# Patient Record
Sex: MALE | Race: WHITE
[De-identification: names, ages, dates, MRNs, and addresses within clinical notes are randomized per-mention and may not be internally consistent; named-entity substitution may affect disease eponyms.]

---

## 2020-07-14 ENCOUNTER — HOSPITAL ENCOUNTER (OUTPATIENT)
Dept: HOSPITAL 75 - LAB | Age: 30
End: 2020-07-14
Attending: PHYSICIAN ASSISTANT
Payer: COMMERCIAL

## 2020-07-14 DIAGNOSIS — Z00.00: Primary | ICD-10-CM

## 2020-07-14 DIAGNOSIS — F90.9: ICD-10-CM

## 2020-07-14 DIAGNOSIS — I10: ICD-10-CM

## 2020-07-14 LAB
ALBUMIN SERPL-MCNC: 5 GM/DL (ref 3.2–4.5)
ALP SERPL-CCNC: 74 U/L (ref 40–136)
ALT SERPL-CCNC: 86 U/L (ref 0–55)
BASOPHILS # BLD AUTO: 0 10^3/UL (ref 0–0.1)
BASOPHILS NFR BLD AUTO: 1 % (ref 0–10)
BILIRUB SERPL-MCNC: 0.7 MG/DL (ref 0.1–1)
BUN/CREAT SERPL: 18
CALCIUM SERPL-MCNC: 10.2 MG/DL (ref 8.5–10.1)
CHLORIDE SERPL-SCNC: 105 MMOL/L (ref 98–107)
CHOLEST SERPL-MCNC: 203 MG/DL (ref ?–200)
CO2 SERPL-SCNC: 25 MMOL/L (ref 21–32)
CREAT SERPL-MCNC: 0.85 MG/DL (ref 0.6–1.3)
EOSINOPHIL # BLD AUTO: 0.4 10^3/UL (ref 0–0.3)
EOSINOPHIL NFR BLD AUTO: 6 % (ref 0–10)
ERYTHROCYTE [DISTWIDTH] IN BLOOD BY AUTOMATED COUNT: 12.9 % (ref 10–14.5)
GFR SERPLBLD BASED ON 1.73 SQ M-ARVRAT: > 60 ML/MIN
GLUCOSE SERPL-MCNC: 111 MG/DL (ref 70–105)
HCT VFR BLD CALC: 46 % (ref 40–54)
HDLC SERPL-MCNC: 38 MG/DL (ref 40–60)
HGB BLD-MCNC: 15.4 G/DL (ref 13.3–17.7)
LYMPHOCYTES # BLD AUTO: 1.2 X 10^3 (ref 1–4)
LYMPHOCYTES NFR BLD AUTO: 21 % (ref 12–44)
MANUAL DIFFERENTIAL PERFORMED BLD QL: NO
MCH RBC QN AUTO: 32 PG (ref 25–34)
MCHC RBC AUTO-ENTMCNC: 34 G/DL (ref 32–36)
MCV RBC AUTO: 95 FL (ref 80–99)
MONOCYTES # BLD AUTO: 0.6 X 10^3 (ref 0–1)
MONOCYTES NFR BLD AUTO: 10 % (ref 0–12)
NEUTROPHILS # BLD AUTO: 3.6 X 10^3 (ref 1.8–7.8)
NEUTROPHILS NFR BLD AUTO: 62 % (ref 42–75)
PLATELET # BLD: 185 10^3/UL (ref 130–400)
PMV BLD AUTO: 10 FL (ref 7.4–10.4)
POTASSIUM SERPL-SCNC: 4.4 MMOL/L (ref 3.6–5)
PROT SERPL-MCNC: 7.9 GM/DL (ref 6.4–8.2)
SODIUM SERPL-SCNC: 139 MMOL/L (ref 135–145)
TRIGL SERPL-MCNC: 196 MG/DL (ref ?–150)
VLDLC SERPL CALC-MCNC: 39 MG/DL (ref 5–40)
WBC # BLD AUTO: 5.8 10^3/UL (ref 4.3–11)

## 2020-07-14 PROCEDURE — 80053 COMPREHEN METABOLIC PANEL: CPT

## 2020-07-14 PROCEDURE — 36415 COLL VENOUS BLD VENIPUNCTURE: CPT

## 2020-07-14 PROCEDURE — 80061 LIPID PANEL: CPT

## 2020-07-14 PROCEDURE — 84443 ASSAY THYROID STIM HORMONE: CPT

## 2020-07-14 PROCEDURE — 85025 COMPLETE CBC W/AUTO DIFF WBC: CPT

## 2021-05-14 ENCOUNTER — HOSPITAL ENCOUNTER (OUTPATIENT)
Dept: HOSPITAL 75 - LABNPT | Age: 31
End: 2021-05-14
Attending: ORTHOPAEDIC SURGERY
Payer: COMMERCIAL

## 2021-05-14 DIAGNOSIS — Z01.812: Primary | ICD-10-CM

## 2021-05-14 DIAGNOSIS — Z20.822: ICD-10-CM

## 2021-05-14 PROCEDURE — 87635 SARS-COV-2 COVID-19 AMP PRB: CPT

## 2021-05-24 ENCOUNTER — HOSPITAL ENCOUNTER (OUTPATIENT)
Dept: HOSPITAL 75 - LAB | Age: 31
End: 2021-05-24
Attending: OBSTETRICS & GYNECOLOGY
Payer: COMMERCIAL

## 2021-05-24 DIAGNOSIS — N46.9: Primary | ICD-10-CM

## 2021-05-24 LAB
SPECIMEN VOL SMN: 4.9 ML (ref 1.5–5)
SPERM MOTILE NFR SMN: 35 %

## 2021-05-24 PROCEDURE — 89320 SEMEN ANAL VOL/COUNT/MOT: CPT

## 2021-05-25 LAB — SPERM ABNORM NFR SMN: 64 %

## 2021-12-17 ENCOUNTER — HOSPITAL ENCOUNTER (OUTPATIENT)
Dept: HOSPITAL 75 - LABNPT | Age: 31
End: 2021-12-17
Attending: INTERNAL MEDICINE
Payer: COMMERCIAL

## 2021-12-17 DIAGNOSIS — R09.81: Primary | ICD-10-CM

## 2021-12-17 DIAGNOSIS — Z20.822: ICD-10-CM

## 2021-12-17 DIAGNOSIS — R50.9: ICD-10-CM

## 2021-12-17 DIAGNOSIS — R05.9: ICD-10-CM

## 2021-12-17 PROCEDURE — 87636 SARSCOV2 & INF A&B AMP PRB: CPT

## 2021-12-22 ENCOUNTER — HOSPITAL ENCOUNTER (OUTPATIENT)
Dept: HOSPITAL 75 - RAD | Age: 31
End: 2021-12-22
Attending: NURSE PRACTITIONER
Payer: COMMERCIAL

## 2021-12-22 DIAGNOSIS — R05.9: Primary | ICD-10-CM

## 2021-12-22 PROCEDURE — 71046 X-RAY EXAM CHEST 2 VIEWS: CPT

## 2021-12-22 NOTE — DIAGNOSTIC IMAGING REPORT
EXAMINATION: 

Chest, 2 views.



HISTORY: 

Cough.



COMPARISON: 

None available.



FINDINGS: 

The heart size and pulmonary vasculature are normal. The lungs

are clear without consolidation, pleural effusion, or

pneumothorax. Degenerative changes of the thoracic spine. Osseous

structures are otherwise intact.



IMPRESSION: 

No acute radiographic abnormality in the chest.



Dictated by: 



  Dictated on workstation # [id number]

## 2022-03-07 ENCOUNTER — HOSPITAL ENCOUNTER (OUTPATIENT)
Dept: HOSPITAL 75 - RAD | Age: 32
End: 2022-03-07
Attending: PHYSICIAN ASSISTANT
Payer: COMMERCIAL

## 2022-03-07 DIAGNOSIS — M54.50: Primary | ICD-10-CM

## 2022-03-07 PROCEDURE — 72100 X-RAY EXAM L-S SPINE 2/3 VWS: CPT

## 2022-03-07 NOTE — DIAGNOSTIC IMAGING REPORT
EXAMINATION: Lumbar spine radiograph



EXAM DATE: 03/07/2022



COMPARISON: None available.



HISTORY: LOW BACK PAIN



TECHNIQUE: 3 views of the lumbar spine



FINDINGS: 



Vertebral body heights and alignment are normal. Disc heights are

normal. The soft tissues are normal.



IMPRESSION:

1. No acute osseous abnormality of the lumbar spine.



Dictated by: 



  Dictated on workstation # [id number]

## 2022-04-11 ENCOUNTER — HOSPITAL ENCOUNTER (OUTPATIENT)
Dept: HOSPITAL 75 - RAD | Age: 32
End: 2022-04-11
Attending: PHYSICIAN ASSISTANT
Payer: COMMERCIAL

## 2022-04-11 DIAGNOSIS — M51.16: ICD-10-CM

## 2022-04-11 DIAGNOSIS — M47.26: Primary | ICD-10-CM

## 2022-04-11 DIAGNOSIS — M48.061: ICD-10-CM

## 2022-04-11 PROCEDURE — 72148 MRI LUMBAR SPINE W/O DYE: CPT

## 2022-04-11 NOTE — DIAGNOSTIC IMAGING REPORT
Clinical Indication: Patient fell in March and has low back pain

with left leg tingling,



Exam: MRI of the lumbar spine performed without IV contrast. 

Sagittal T2, sagittal T1, sagittal T2 fat sat, and axial T2.



Comparison: X-ray of the lumbar spine dated 03/07/2022.



Findings:

Five lumbar type vertebra are identified.  Lumbar spine has

normal alignment with no fracture or dislocation.  The lumbar

vertebra have normal T1 and T2 signal. The visualized portions of

the distal spinal cord, conus medullaris, and cauda equina have

normal anatomic appearance. The conus medullaris tip is seen at

the lower L1 vertebral body level. No paraspinal soft tissue

abnormality is seen. 



L1-L2: Unremarkable.



L2-L3: Unremarkable.



L3-L4: Unremarkable.



L4-L5: There is minimal disk bulging in the left foraminal region

and mild facet arthropathy which causes mild-to-moderate left

neural foramen narrowing. There is mild disk bulge in the right

subarticular region and mild facet arthropathy which causes

mild-to-moderate right neural foramen narrowing. There is no

significant central canal stenosis. There is Modic type II

degenerative signal changes anteriorly involving the endplates.



L5-S1: Unremarkable.



Impression:

1: There is mild disk bulging in the bilateral L4-L5 subarticular

regions which causes mild-to-moderate bilateral neural foramen

narrowing. There is no significant central canal stenosis. There

is Modic type II degenerative signal change seen at this level.



2: The remainder of the lumbar spine is unremarkable.



Dictated by: 



  Dictated on workstation # [id number]

## 2022-08-11 ENCOUNTER — HOSPITAL ENCOUNTER (OUTPATIENT)
Dept: HOSPITAL 75 - RAD | Age: 32
End: 2022-08-11
Attending: PHYSICIAN ASSISTANT
Payer: COMMERCIAL

## 2022-08-11 DIAGNOSIS — R22.0: ICD-10-CM

## 2022-08-11 DIAGNOSIS — R51.9: Primary | ICD-10-CM

## 2022-08-11 PROCEDURE — 70470 CT HEAD/BRAIN W/O & W/DYE: CPT

## 2022-08-11 NOTE — DIAGNOSTIC IMAGING REPORT
PROCEDURE: CT head with and without contrast.



TECHNIQUE: Multiple contiguous axial images were obtained through

the brain before and after the administration of intravenous

contrast. Auto Exposure Controls were utilized during the CT exam

to meet ALARA standards for radiation dose reduction. 



INDICATION: Headache and head mass.



COMPARISON: No prior studies are available for comparison.



The ventricles and sulci are within normal limits. No sulcal

effacement or midline shift is identified. No acute intra-axial

or extra-axial hemorrhage is detected. No abnormal enhancing

lesion is identified on the postcontrast images. Calvarium is

unremarkable. Cisterns are patent. The visualized paranasal

sinuses are clear.



IMPRESSION: Unremarkable pre and postcontrast CT of the brain.



Dictated by: 



  Dictated on workstation # [id number]

## 2023-01-10 ENCOUNTER — HOSPITAL ENCOUNTER (OUTPATIENT)
Dept: HOSPITAL 75 - RAD | Age: 33
End: 2023-01-10
Attending: NURSE PRACTITIONER
Payer: COMMERCIAL

## 2023-01-10 DIAGNOSIS — K62.89: Primary | ICD-10-CM

## 2023-01-10 PROCEDURE — 72220 X-RAY EXAM SACRUM TAILBONE: CPT

## 2023-01-10 NOTE — DIAGNOSTIC IMAGING REPORT
CLINICAL HISTORY: Coccyx pain. No known injury.



COMPARISON: None.



TECHNIQUE: 3 views of the sacrum and coccyx.



FINDINGS: 

There is no acute fracture or dislocation of the sacrum and

coccyx.  Alignment is anatomic.  The imaged joint spaces are

preserved. No focal osseous lesions.



IMPRESSION: 

1. No acute fracture or dislocation in the sacrum and coccyx.



Dictated by: 



  Dictated on workstation # [id number]